# Patient Record
Sex: MALE | Race: AMERICAN INDIAN OR ALASKA NATIVE | Employment: FULL TIME | URBAN - METROPOLITAN AREA
[De-identification: names, ages, dates, MRNs, and addresses within clinical notes are randomized per-mention and may not be internally consistent; named-entity substitution may affect disease eponyms.]

---

## 2021-04-12 ENCOUNTER — TRANSFERRED RECORDS (OUTPATIENT)
Dept: HEALTH INFORMATION MANAGEMENT | Facility: CLINIC | Age: 26
End: 2021-04-12

## 2021-04-26 NOTE — PROGRESS NOTES
"Otolaryngology Consultation    Patient: Marvin Wheeler  : 1995    Patient presents with:  Consult: Pt has been referred by Dr. Hayes for a sore throat.    HPI:  Marvin Wheeler is a 25 year old male seen today for sore throat.   Marvin presents for ongoing concerns of oral irritation.   He was diagnosed with sore throat and treated with Augmentin. His throat symptoms have improved. He has no concerns with dental pain, oral pain at this time.   No pain with eating.     He does use tobacco.   He noted spots on his upper lip and right lower gum line.   Unknown last dental visit.     Patient denies sore throat or throat pain  Denies chronic otalgia.   Denies fevers, night sweats or weight loss. Hx of fever associated with symptoms.   Denies dysphagia or dysphonia.   Denies globus sensation.     Water- Adequate  Caffeine- None   ETOH- decreased use. He was drinking more, but has been decreasing.  Tobacco- 1 pack per day   Oral chewing- Quit. He was chewing for several years.   He has been using herbal snuff with relief and aiding in quitting use   Reflux- None.         No current outpatient medications on file.       Allergies: Patient has no known allergies.     Past Medical History:   Diagnosis Date     Acute sinusitis 10/02/2018     Anxiety      Encopresis      HTN (hypertension)      Non-cardiac chest pain 2020     Obstipation 2016     Palpitation        No past surgical history on file.    ENT family history reviewed    Social History     Tobacco Use     Smoking status: Current Every Day Smoker     Packs/day: 1.00     Types: Cigarettes   Substance Use Topics     Alcohol use: Yes     Drug use: Not on file       Review of Systems  ROS: 10 point ROS neg other than the symptoms noted above in the HPI     Physical Exam  BP (!) 142/76 (Cuff Size: Adult Large)   Pulse 90   Temp 97.6  F (36.4  C) (Tympanic)   Ht 1.905 m (6' 3\")   Wt 136.1 kg (300 lb)   SpO2 98%   BMI 37.50 kg/m    General - The patient is " well nourished and well developed, and appears to have good nutritional status.  Alert and oriented to person and place, answers questions and cooperates with examination appropriately.   Head and Face - Normocephalic and atraumatic, with no gross asymmetry noted.  The facial nerve is intact, with strong symmetric movements.  Voice and Breathing - The patient was breathing comfortably without the use of accessory muscles. There was no wheezing, stridor, or stertor.  The patients voice was clear and strong, and had appropriate pitch and quality.  Ears -The external auditory canals are patent, the tympanic membranes are intact without effusion, retraction or mass.  Bony landmarks are intact.  Eyes - Extraocular movements intact, and the pupils were reactive to light.  Sclera were not icteric or injected, conjunctiva were pink and moist.  Mouth - Examination of the oral cavity showed pink, healthy oral mucosa. The tongue was mobile and midline, and the dentition was in fair. Gumline changes along dentition, gum lines appear receding.   There is one upper firm 5 mm round lesion, appears firm and normal coloration. Photo taken.   Scattered sites of thickened white changes. Appears healing from prior description.     Throat - The walls of the oropharynx were smooth, pink, moist, symmetric, and had no lesions or ulcerations.  The tonsillar pillars and soft palate were symmetric.  The uvula was midline on elevation.    Neck - Normal midline excursion of the laryngotracheal complex during swallowing.  Full range of motion on passive movement.  Palpation of the occipital, submental, submandibular, internal jugular chain, and supraclavicular nodes did not demonstrate any abnormal lymph nodes or masses.  Palpation of the thyroid was soft and smooth, with no nodules or goiter appreciated.  The trachea was mobile and midline.  Nose - External contour is symmetric, no gross deflection or scars.  Nasal mucosa is pink and moist with  no abnormal mucus.  Nasal spur along Right, posterior. Moderate IT hypertrophy.     Flexible Endoscopy -     Attempts at mirror laryngoscopy were not possible due to gag reflex.  Therefore I proceeded with a fiberoptic examination.  First I sprayed both sides of the nose with a mixture of lidocaine and neosynephrine.  I then passed the scope through the nasal cavity.  The nasal cavity was unremarkable.  The nasopharynx was mucosally covered and symmetric.  The Eustachian tube openings were unobstructed.  Going further down I had a clear view of the base of tongue which had normal appearing lingual tonsillar tissue.  The base of tongue was free of lesions, and the vallecula was open.  The epiglottis was smooth and mucosally covered.  The supraglottic larynx was then clearly visualized.  The vocal cords moved smoothly and symmetrically, they were pearly white and no lesions were seen.  The pyriform sinuses were open, and the limited view of the postcricoid region did not show any lesions.    Procedure - Oral cavity lesion biopsy.     Informed consent was discussed and signed, and risks of the procedure were discussed, including bleeding, anesthesia, infection, scar formation, numbness, need for additional surgery, injury to salivary tissue.  I then infiltrated the mucosal tissues with 1% lidocaine with 1:200,000 epinephrine.  I then used a 4 mm punch biopsy and I then elevated the  ellipse and a thin cuff of underlying normal appearing mucosa with the scalpel.  I proceeded to use a fine iris scissor to undermine the lesion and excise it.   Hemostasis was achieved with direct pressure and silver nitrate cautery.   The patient tolerated the procedure without any difficulty.        ASSESSMENT:    ICD-10-CM    1. Oral lesion  K13.70 chlorhexidine (PERIDEX) 0.12 % solution     Surgical pathology exam   2. Tobacco use  Z72.0    3. Gingivitis  K05.10 chlorhexidine (PERIDEX) 0.12 % solution   4. History of sore throat  Z87.09         This patient has had removal of an oral  Bx.   I have instructed the patient on wound care with 1/2 strength peroxide rinses for 1 week, and  Diet.  Ibuprofen alternated with tylenol prn pain.  The patient will be called with pathology results.      Quit tobacco.   Tobacco cessation was strongly encouraged.  The associated risk of head and neck cancer was discussed.  Every year of cessation offers health benefits. This was discussed with the patient today and they voiced understanding.    Start dental cares.   Follow up with Dentist. Complete cleaning/ dental films/ x-rays   DDS to examine upper palate change, appears bony changes. Complete imaging and if recommended return for excision.   Oral biopsy completed along lower mucosa. Oral mucosa appears healing at this time, improved from initial description. Congratulated on quitting chewing tobacco, but recommended full tobacco cessation.   Follow post procedure instructions. Rinse.   Will call with pathology.  Recommended avoiding/ quitting oral chewing tobacco.   Start Peridex mouth rinse as directed (about 5-7 days after biopsy site heals).   Follow up with Dr. Hayes for Blood pressure management.           Alessandra Sorensen PA-C  [facility]  [phone]

## 2021-04-27 ENCOUNTER — OFFICE VISIT (OUTPATIENT)
Dept: OTOLARYNGOLOGY | Facility: OTHER | Age: 26
End: 2021-04-27
Attending: PHYSICIAN ASSISTANT
Payer: COMMERCIAL

## 2021-04-27 VITALS
OXYGEN SATURATION: 98 % | DIASTOLIC BLOOD PRESSURE: 76 MMHG | HEIGHT: 75 IN | TEMPERATURE: 97.6 F | SYSTOLIC BLOOD PRESSURE: 142 MMHG | HEART RATE: 90 BPM | WEIGHT: 300 LBS | BODY MASS INDEX: 37.3 KG/M2

## 2021-04-27 DIAGNOSIS — K05.10 GINGIVITIS: ICD-10-CM

## 2021-04-27 DIAGNOSIS — K13.70 ORAL LESION: Primary | ICD-10-CM

## 2021-04-27 DIAGNOSIS — Z87.09 HISTORY OF SORE THROAT: ICD-10-CM

## 2021-04-27 DIAGNOSIS — Z72.0 TOBACCO USE: ICD-10-CM

## 2021-04-27 PROCEDURE — 99203 OFFICE O/P NEW LOW 30 MIN: CPT | Mod: 25 | Performed by: PHYSICIAN ASSISTANT

## 2021-04-27 PROCEDURE — 31575 DIAGNOSTIC LARYNGOSCOPY: CPT | Performed by: PHYSICIAN ASSISTANT

## 2021-04-27 PROCEDURE — 88312 SPECIAL STAINS GROUP 1: CPT | Mod: TC | Performed by: PHYSICIAN ASSISTANT

## 2021-04-27 PROCEDURE — 40808 BIOPSY OF MOUTH LESION: CPT | Performed by: PHYSICIAN ASSISTANT

## 2021-04-27 PROCEDURE — 88305 TISSUE EXAM BY PATHOLOGIST: CPT | Mod: TC | Performed by: PHYSICIAN ASSISTANT

## 2021-04-27 PROCEDURE — G0463 HOSPITAL OUTPT CLINIC VISIT: HCPCS

## 2021-04-27 PROCEDURE — 40808 BIOPSY OF MOUTH LESION: CPT

## 2021-04-27 RX ORDER — CHLORHEXIDINE GLUCONATE ORAL RINSE 1.2 MG/ML
15 SOLUTION DENTAL 2 TIMES DAILY
Qty: 236 ML | Refills: 0 | Status: SHIPPED | OUTPATIENT
Start: 2021-04-27

## 2021-04-27 ASSESSMENT — MIFFLIN-ST. JEOR: SCORE: 2431.42

## 2021-04-27 ASSESSMENT — PAIN SCALES - GENERAL: PAINLEVEL: NO PAIN (0)

## 2021-04-27 NOTE — PATIENT INSTRUCTIONS
Quit tobacco.   Tobacco cessation was strongly encouraged.  The associated risk of head and neck cancer was discussed.  Every year of cessation offers health benefits. This was discussed with the patient today and they voiced understanding.  Follow up with Dentist. Complete cleaning/ dental films/ x-rays Start dental cares.   Follow post procedure instructions. Rinse.   Start Peridex mouth rinse as directed (about 5-7 days after biopsy site heals).   Follow up with Dr. Hayes for Blood pressure management.     Thank you for allowing [facility] and our ENT team to participate in your care.  If your medications are too expensive, please give the nurse a call.  We can possibly change this medication.  If you have a scheduling or an appointment question please contact our Health Unit Coordinator at their direct line [phone] ext: [phone].   ALL nursing questions or concerns can be directed to your ENT nurse at: [phone]--[facility]          POST PROCEDURE INSTRUCTIONS      For oral lesions, rinse your mouth with a mixture of half water and half hydrogen peroxide 3 times daily, after meals and before bed.       For lip lesions, apply Aquaphor Healing Ointment to the wound 3 times daily after cleaning with above mixture(Unless specified Bacitracin).      You can apply ice to the surgical area to help reduce swelling. (no longer than 20 minutes at a time)       You can use acetaminophen(Tylenol) or the prescription you received for pain.       If you have sutures in place these are dissolvable. They will fall out on their own. DO NOT play with them as this will cause more irritation to the surgical area.       If cautery was used, that is the blackened area you see. This will fade away and can become a white patchy area. This is normal! Continue to clean wound as described above.     SIGNS OF INFECTION ARE:    Redness, swelling, red streaks, pus, drainage, warmth, fever, increased pain, foul smell.     Contact your  primary health care provider if you notice any of the warning signs.     FOLLOW - UP    Follow-up as needed in clinic.     Pathology results will be called to you when they are back. This can take approx. 7-10 days.

## 2021-04-27 NOTE — LETTER
2021         RE: Marvin Wheeler  [address]  [place] [address]        Dear Colleague,    Thank you for referring your patient, Marvin Wheeler, to the [facility]. Please see a copy of my visit note below.    Otolaryngology Consultation    Patient: Marvin Wheeler  : 1995    Patient presents with:  Consult: Pt has been referred by Dr. Hayes for a sore throat.    HPI:  Marvin Wheeler is a 25 year old male seen today for sore throat.   Marvin presents for ongoing concerns of oral irritation.   He was diagnosed with sore throat and treated with Augmentin. His throat symptoms have improved. He has no concerns with dental pain, oral pain at this time.   No pain with eating.     He does use tobacco.   He noted spots on his upper lip and right lower gum line.   Unknown last dental visit.     Patient denies sore throat or throat pain  Denies chronic otalgia.   Denies fevers, night sweats or weight loss. Hx of fever associated with symptoms.   Denies dysphagia or dysphonia.   Denies globus sensation.     Water- Adequate  Caffeine- None   ETOH- decreased use. He was drinking more, but has been decreasing.  Tobacco- 1 pack per day   Oral chewing- Quit. He was chewing for several years.   He has been using herbal snuff with relief and aiding in quitting use   Reflux- None.         No current outpatient medications on file.       Allergies: Patient has no known allergies.     Past Medical History:   Diagnosis Date     Acute sinusitis 10/02/2018     Anxiety      Encopresis      HTN (hypertension)      Non-cardiac chest pain 2020     Obstipation 2016     Palpitation        No past surgical history on file.    ENT family history reviewed    Social History     Tobacco Use     Smoking status: Current Every Day Smoker     Packs/day: 1.00     Types: Cigarettes   Substance Use Topics     Alcohol use: Yes     Drug use: Not on file       Review of Systems  ROS: 10 point ROS neg  "other than the symptoms noted above in the HPI     Physical Exam  BP (!) 142/76 (Cuff Size: Adult Large)   Pulse 90   Temp 97.6  F (36.4  C) (Tympanic)   Ht 1.905 m (6' 3\")   Wt 136.1 kg (300 lb)   SpO2 98%   BMI 37.50 kg/m    General - The patient is well nourished and well developed, and appears to have good nutritional status.  Alert and oriented to person and place, answers questions and cooperates with examination appropriately.   Head and Face - Normocephalic and atraumatic, with no gross asymmetry noted.  The facial nerve is intact, with strong symmetric movements.  Voice and Breathing - The patient was breathing comfortably without the use of accessory muscles. There was no wheezing, stridor, or stertor.  The patients voice was clear and strong, and had appropriate pitch and quality.  Ears -The external auditory canals are patent, the tympanic membranes are intact without effusion, retraction or mass.  Bony landmarks are intact.  Eyes - Extraocular movements intact, and the pupils were reactive to light.  Sclera were not icteric or injected, conjunctiva were pink and moist.  Mouth - Examination of the oral cavity showed pink, healthy oral mucosa. The tongue was mobile and midline, and the dentition was in fair. Gumline changes along dentition, gum lines appear receding.   There is one upper firm 5 mm round lesion, appears firm and normal coloration. Photo taken.   Scattered sites of thickened white changes. Appears healing from prior description.     Throat - The walls of the oropharynx were smooth, pink, moist, symmetric, and had no lesions or ulcerations.  The tonsillar pillars and soft palate were symmetric.  The uvula was midline on elevation.    Neck - Normal midline excursion of the laryngotracheal complex during swallowing.  Full range of motion on passive movement.  Palpation of the occipital, submental, submandibular, internal jugular chain, and supraclavicular nodes did not demonstrate any " abnormal lymph nodes or masses.  Palpation of the thyroid was soft and smooth, with no nodules or goiter appreciated.  The trachea was mobile and midline.  Nose - External contour is symmetric, no gross deflection or scars.  Nasal mucosa is pink and moist with no abnormal mucus.  Nasal spur along Right, posterior. Moderate IT hypertrophy.     Flexible Endoscopy -     Attempts at mirror laryngoscopy were not possible due to gag reflex.  Therefore I proceeded with a fiberoptic examination.  First I sprayed both sides of the nose with a mixture of lidocaine and neosynephrine.  I then passed the scope through the nasal cavity.  The nasal cavity was unremarkable.  The nasopharynx was mucosally covered and symmetric.  The Eustachian tube openings were unobstructed.  Going further down I had a clear view of the base of tongue which had normal appearing lingual tonsillar tissue.  The base of tongue was free of lesions, and the vallecula was open.  The epiglottis was smooth and mucosally covered.  The supraglottic larynx was then clearly visualized.  The vocal cords moved smoothly and symmetrically, they were pearly white and no lesions were seen.  The pyriform sinuses were open, and the limited view of the postcricoid region did not show any lesions.    Procedure - Oral cavity lesion biopsy.     Informed consent was discussed and signed, and risks of the procedure were discussed, including bleeding, anesthesia, infection, scar formation, numbness, need for additional surgery, injury to salivary tissue.  I then infiltrated the mucosal tissues with 1% lidocaine with 1:200,000 epinephrine.  I then used a 4 mm punch biopsy and I then elevated the  ellipse and a thin cuff of underlying normal appearing mucosa with the scalpel.  I proceeded to use a fine iris scissor to undermine the lesion and excise it.   Hemostasis was achieved with direct pressure and silver nitrate cautery.   The patient tolerated the procedure without any  difficulty.        ASSESSMENT:    ICD-10-CM    1. Oral lesion  K13.70 chlorhexidine (PERIDEX) 0.12 % solution     Surgical pathology exam   2. Tobacco use  Z72.0    3. Gingivitis  K05.10 chlorhexidine (PERIDEX) 0.12 % solution   4. History of sore throat  Z87.09        This patient has had removal of an oral  Bx.   I have instructed the patient on wound care with 1/2 strength peroxide rinses for 1 week, and  Diet.  Ibuprofen alternated with tylenol prn pain.  The patient will be called with pathology results.      Quit tobacco.   Tobacco cessation was strongly encouraged.  The associated risk of head and neck cancer was discussed.  Every year of cessation offers health benefits. This was discussed with the patient today and they voiced understanding.    Start dental cares.   Follow up with Dentist. Complete cleaning/ dental films/ x-rays   DDS to examine upper palate change, appears bony changes. Complete imaging and if recommended return for excision.   Oral biopsy completed along lower mucosa. Oral mucosa appears healing at this time, improved from initial description. Congratulated on quitting chewing tobacco, but recommended full tobacco cessation.   Follow post procedure instructions. Rinse.   Will call with pathology.  Recommended avoiding/ quitting oral chewing tobacco.   Start Peridex mouth rinse as directed (about 5-7 days after biopsy site heals).   Follow up with Dr. Hayes for Blood pressure management.           Alessandra Sorensen PA-C  ENT  [facility]  [phone]        Again, thank you for allowing me to participate in the care of your patient.        Sincerely,        Alessandra Sorensen PA-C

## 2021-04-27 NOTE — NURSING NOTE
"Chief Complaint   Patient presents with     Consult     Pt has been referred by Dr. Hayes for a sore throat.       Initial BP (!) 142/76 (Cuff Size: Adult Large)   Pulse 90   Temp 97.6  F (36.4  C) (Tympanic)   Ht 1.905 m (6' 3\")   Wt 136.1 kg (300 lb)   SpO2 98%   BMI 37.50 kg/m   Estimated body mass index is 37.5 kg/m  as calculated from the following:    Height as of this encounter: 1.905 m (6' 3\").    Weight as of this encounter: 136.1 kg (300 lb).  Medication Reconciliation: complete  Carley Stone LPN    "

## 2021-05-03 LAB — COPATH REPORT: NORMAL

## 2021-05-06 ENCOUNTER — TELEPHONE (OUTPATIENT)
Dept: OTOLARYNGOLOGY | Facility: OTHER | Age: 26
End: 2021-05-06

## 2021-05-06 NOTE — TELEPHONE ENCOUNTER
Call from patient on Biopsy Results from 4/27/21    Provider: Alessandra Sorensen      Patient can be reached at [phone]

## 2025-08-06 ENCOUNTER — TELEPHONE (OUTPATIENT)
Dept: OTOLARYNGOLOGY | Facility: OTHER | Age: 30
End: 2025-08-06